# Patient Record
Sex: FEMALE | Race: WHITE | NOT HISPANIC OR LATINO | URBAN - METROPOLITAN AREA
[De-identification: names, ages, dates, MRNs, and addresses within clinical notes are randomized per-mention and may not be internally consistent; named-entity substitution may affect disease eponyms.]

---

## 2020-07-25 ENCOUNTER — TELEPHONE ENCOUNTER (OUTPATIENT)
Dept: URBAN - METROPOLITAN AREA CLINIC 13 | Facility: CLINIC | Age: 75
End: 2020-07-25

## 2020-07-25 RX ORDER — MULTIVIT-MIN/FOLIC/VIT K/LYCOP 400-300MCG
TAKE 1 TABLET DAILY TABLET ORAL
Refills: 0 | OUTPATIENT
End: 2014-10-09

## 2020-07-25 RX ORDER — MELOXICAM 15 MG/1
TAKE 1 TABLET DAILY TABLET ORAL
Refills: 0 | OUTPATIENT
End: 2018-07-23

## 2020-07-25 RX ORDER — TELMISARTAN/HYDROCHLOROTHIAZID 40-12.5 MG
TAKE 1 TABLET DAILY TABLET ORAL
Refills: 0 | OUTPATIENT
End: 2018-07-24

## 2020-07-25 RX ORDER — GINSENG 100 MG
TAKE 1 CAPSULE DAILY PT UNSURE OF DOSAGE CAPSULE ORAL
Refills: 0 | OUTPATIENT
End: 2015-10-01

## 2020-07-25 RX ORDER — UBIDECARENONE/VIT E ACET 100MG-5
TAKE 1 CAPSULE DAILY PT UNSURE OF DOSAGE CAPSULE ORAL
Refills: 0 | OUTPATIENT
Start: 2009-06-16 | End: 2014-10-09

## 2020-07-25 RX ORDER — ST. JOHN'S WORT 300 MG
TAKE 1 CAPSULE DAILY PT UNSURE OF DOSAGE CAPSULE ORAL
Refills: 0 | OUTPATIENT
Start: 2009-06-16 | End: 2014-10-09

## 2020-07-25 RX ORDER — FERROUS FUMARATE 324(106)MG
TAKE 1 TABLET DAILY AS DIRECTED TABLET ORAL
Refills: 0 | OUTPATIENT
End: 2018-07-23

## 2020-07-25 RX ORDER — SUCRALFATE 1 G/1
TAKE 1 TABLET 4 TIMES DAILY, BEFORE MEALS AND AT BEDTIME TABLET ORAL
Qty: 120 | Refills: 2 | OUTPATIENT
Start: 2014-11-04 | End: 2018-07-24

## 2020-07-25 RX ORDER — GLUCOSAMINE SULFATE 500 MG
TAKE 2 CAPSULE DAILY DOSAGE UNKNOWN CAPSULE ORAL
Refills: 0 | OUTPATIENT
Start: 2009-06-16 | End: 2014-10-09

## 2020-07-26 ENCOUNTER — TELEPHONE ENCOUNTER (OUTPATIENT)
Dept: URBAN - METROPOLITAN AREA CLINIC 13 | Facility: CLINIC | Age: 75
End: 2020-07-26

## 2020-07-26 RX ORDER — LOSARTAN POTASSIUM 100 MG/1
TAKE 1 TABLET DAILY TABLET, FILM COATED ORAL
Refills: 0 | Status: ACTIVE | COMMUNITY
Start: 2018-07-23

## 2020-07-26 RX ORDER — OLOPATADINE HYDROCHLORIDE 2 MG/ML
SOLUTION OPHTHALMIC
Qty: 25 | Refills: 0 | Status: ACTIVE | COMMUNITY
Start: 2014-10-10

## 2020-07-26 RX ORDER — PRAVASTATIN SODIUM 80 MG/1
TAKE 1 TABLET DAILY TABLET ORAL
Refills: 0 | Status: ACTIVE | COMMUNITY

## 2020-07-26 RX ORDER — PRAVASTATIN SODIUM 20 MG/1
TABLET ORAL
Qty: 30 | Refills: 0 | Status: ACTIVE | COMMUNITY
Start: 2014-01-09

## 2020-07-26 RX ORDER — NYSTATIN AND TRIAMCINOLONE ACETONIDE 100000; 1 [USP'U]/G; MG/G
OINTMENT TOPICAL
Qty: 30 | Refills: 0 | Status: ACTIVE | COMMUNITY
Start: 2014-08-19

## 2020-07-26 RX ORDER — AZITHROMYCIN DIHYDRATE 250 MG/1
TABLET, FILM COATED ORAL
Qty: 6 | Refills: 0 | Status: ACTIVE | COMMUNITY
Start: 2012-02-27

## 2020-07-26 RX ORDER — TRAMADOL HYDROCHLORIDE 50 MG/1
TAKE 1 TABLET 4 TIMES DAILY PRN TABLET ORAL
Refills: 0 | Status: ACTIVE | COMMUNITY

## 2020-07-26 RX ORDER — MELOXICAM 7.5 MG/1
TABLET ORAL
Qty: 30 | Refills: 0 | Status: ACTIVE | COMMUNITY
Start: 2013-09-30

## 2020-07-26 RX ORDER — CELECOXIB 200 MG/1
CAPSULE ORAL
Qty: 30 | Refills: 0 | Status: ACTIVE | COMMUNITY
Start: 2011-07-25

## 2020-07-26 RX ORDER — TOBRAMYCIN AND DEXAMETHASONE 3; 1 MG/G; MG/G
OINTMENT OPHTHALMIC
Qty: 35 | Refills: 0 | Status: ACTIVE | COMMUNITY
Start: 2014-02-17

## 2020-07-26 RX ORDER — LOTEPREDNOL ETABONATE 5 MG/G
OINTMENT OPHTHALMIC
Qty: 35 | Refills: 0 | Status: ACTIVE | COMMUNITY
Start: 2014-10-10

## 2020-07-26 RX ORDER — MELOXICAM 15 MG/1
TABLET ORAL
Qty: 30 | Refills: 0 | Status: ACTIVE | COMMUNITY
Start: 2014-01-09

## 2020-07-26 RX ORDER — PROMETHAZINE HYDROCHLORIDE 25 MG/1
SUPPOSITORY RECTAL
Qty: 4 | Refills: 0 | Status: ACTIVE | COMMUNITY
Start: 2012-02-27

## 2020-07-26 RX ORDER — AZITHROMYCIN DIHYDRATE 250 MG/1
TABLET, FILM COATED ORAL
Qty: 6 | Refills: 0 | Status: ACTIVE | COMMUNITY
Start: 2014-08-26

## 2020-07-26 RX ORDER — PRAVASTATIN SODIUM 40 MG/1
TABLET ORAL
Qty: 30 | Refills: 0 | Status: ACTIVE | COMMUNITY
Start: 2012-06-12

## 2021-09-23 ENCOUNTER — LAB OUTSIDE AN ENCOUNTER (OUTPATIENT)
Dept: URBAN - METROPOLITAN AREA CLINIC 113 | Facility: CLINIC | Age: 76
End: 2021-09-23

## 2021-09-23 ENCOUNTER — WEB ENCOUNTER (OUTPATIENT)
Dept: URBAN - METROPOLITAN AREA CLINIC 113 | Facility: CLINIC | Age: 76
End: 2021-09-23

## 2021-09-23 ENCOUNTER — OFFICE VISIT (OUTPATIENT)
Dept: URBAN - METROPOLITAN AREA CLINIC 113 | Facility: CLINIC | Age: 76
End: 2021-09-23
Payer: MEDICARE

## 2021-09-23 VITALS
HEIGHT: 59 IN | WEIGHT: 161 LBS | DIASTOLIC BLOOD PRESSURE: 65 MMHG | HEART RATE: 70 BPM | RESPIRATION RATE: 20 BRPM | SYSTOLIC BLOOD PRESSURE: 134 MMHG | TEMPERATURE: 97.7 F | BODY MASS INDEX: 32.46 KG/M2

## 2021-09-23 DIAGNOSIS — K21.9 CHRONIC GERD: ICD-10-CM

## 2021-09-23 DIAGNOSIS — R10.13 EPIGASTRIC PAIN: ICD-10-CM

## 2021-09-23 PROCEDURE — 99213 OFFICE O/P EST LOW 20 MIN: CPT | Performed by: INTERNAL MEDICINE

## 2021-09-23 RX ORDER — TRAMADOL HYDROCHLORIDE 50 MG/1
1 TABLET AS NEEDED TABLET, FILM COATED ORAL ONCE A DAY
Status: ACTIVE | COMMUNITY

## 2021-09-23 RX ORDER — PROMETHAZINE HYDROCHLORIDE 25 MG/1
SUPPOSITORY RECTAL
Qty: 4 | Refills: 0 | Status: ON HOLD | COMMUNITY
Start: 2012-02-27

## 2021-09-23 RX ORDER — PRAVASTATIN SODIUM 20 MG/1
TABLET ORAL
Qty: 30 | Refills: 0 | Status: ON HOLD | COMMUNITY
Start: 2014-01-09

## 2021-09-23 RX ORDER — ROSUVASTATIN CALCIUM 10 MG/1
1 TABLET TABLET, FILM COATED ORAL ONCE A DAY
Status: ACTIVE | COMMUNITY

## 2021-09-23 RX ORDER — CELECOXIB 200 MG/1
CAPSULE ORAL
Qty: 30 | Refills: 0 | Status: ON HOLD | COMMUNITY
Start: 2011-07-25

## 2021-09-23 RX ORDER — ESOMEPRAZOLE MAGNESIUM 40 MG/1
1 CAPSULE CAPSULE, DELAYED RELEASE ORAL ONCE A DAY
Status: ACTIVE | COMMUNITY

## 2021-09-23 RX ORDER — FERROUS SULFATE 325(65) MG
1 TABLET TABLET ORAL ONCE A DAY
Status: ACTIVE | COMMUNITY

## 2021-09-23 RX ORDER — TOBRAMYCIN AND DEXAMETHASONE 3; 1 MG/G; MG/G
OINTMENT OPHTHALMIC
Qty: 35 | Refills: 0 | Status: ON HOLD | COMMUNITY
Start: 2014-02-17

## 2021-09-23 RX ORDER — ST. JOHN'S WORT 300 MG
AS DIRECTED CAPSULE ORAL
Status: ACTIVE | COMMUNITY

## 2021-09-23 RX ORDER — LISINOPRIL 2.5 MG/1
1 TABLET TABLET ORAL ONCE A DAY
Status: ACTIVE | COMMUNITY

## 2021-09-23 RX ORDER — ACETAMINOPHEN ER 650 MG TABLET,EXTENDED RELEASE 650 MG
2 TABLETS AS NEEDED TABLET, EXTENDED RELEASE ORAL
Status: ACTIVE | COMMUNITY

## 2021-09-23 RX ORDER — MELOXICAM 7.5 MG/1
TABLET ORAL
Qty: 30 | Refills: 0 | Status: ON HOLD | COMMUNITY
Start: 2013-09-30

## 2021-09-23 RX ORDER — NYSTATIN AND TRIAMCINOLONE ACETONIDE 100000; 1 [USP'U]/G; MG/G
OINTMENT TOPICAL
Qty: 30 | Refills: 0 | Status: ON HOLD | COMMUNITY
Start: 2014-08-19

## 2021-09-23 RX ORDER — TRAMADOL HYDROCHLORIDE 50 MG/1
TAKE 1 TABLET 4 TIMES DAILY PRN TABLET ORAL
Refills: 0 | Status: ON HOLD | COMMUNITY

## 2021-09-23 RX ORDER — LOSARTAN POTASSIUM 100 MG/1
TAKE 1 TABLET DAILY TABLET, FILM COATED ORAL
Refills: 0 | Status: ON HOLD | COMMUNITY
Start: 2018-07-23

## 2021-09-23 RX ORDER — PRAVASTATIN SODIUM 40 MG/1
TABLET ORAL
Qty: 30 | Refills: 0 | Status: ON HOLD | COMMUNITY
Start: 2012-06-12

## 2021-09-23 RX ORDER — LOTEPREDNOL ETABONATE 5 MG/G
OINTMENT OPHTHALMIC
Qty: 35 | Refills: 0 | Status: ON HOLD | COMMUNITY
Start: 2014-10-10

## 2021-09-23 RX ORDER — MELOXICAM 15 MG/1
TABLET ORAL
Qty: 30 | Refills: 0 | Status: ON HOLD | COMMUNITY
Start: 2014-01-09

## 2021-09-23 RX ORDER — B-COMPLEX WITH VITAMIN C
AS DIRECTED TABLET ORAL
Status: ACTIVE | COMMUNITY

## 2021-09-23 RX ORDER — AZITHROMYCIN DIHYDRATE 250 MG/1
TABLET, FILM COATED ORAL
Qty: 6 | Refills: 0 | Status: ON HOLD | COMMUNITY
Start: 2012-02-27

## 2021-09-23 RX ORDER — METFORMIN ER 500 MG 500 MG/1
1 TABLET WITH EVENING MEAL TABLET ORAL ONCE A DAY
Status: ACTIVE | COMMUNITY

## 2021-09-23 RX ORDER — PRAVASTATIN SODIUM 80 MG/1
TAKE 1 TABLET DAILY TABLET ORAL
Refills: 0 | Status: ON HOLD | COMMUNITY

## 2021-09-23 RX ORDER — ACETAMINOPHEN 500 MG
AS DIRECTED TABLET ORAL
Status: ACTIVE | COMMUNITY

## 2021-09-23 RX ORDER — OLOPATADINE HYDROCHLORIDE 2 MG/ML
SOLUTION OPHTHALMIC
Qty: 25 | Refills: 0 | Status: ON HOLD | COMMUNITY
Start: 2014-10-10

## 2021-09-23 RX ORDER — MULTIVIT-MIN/IRON/FOLIC ACID/K 18-600-40
AS DIRECTED CAPSULE ORAL
Status: ACTIVE | COMMUNITY

## 2021-09-24 ENCOUNTER — CLAIMS CREATED FROM THE CLAIM WINDOW (OUTPATIENT)
Dept: URBAN - METROPOLITAN AREA CLINIC 4 | Facility: CLINIC | Age: 76
End: 2021-09-24
Payer: MEDICARE

## 2021-09-24 ENCOUNTER — OFFICE VISIT (OUTPATIENT)
Dept: URBAN - METROPOLITAN AREA SURGERY CENTER 25 | Facility: SURGERY CENTER | Age: 76
End: 2021-09-24
Payer: MEDICARE

## 2021-09-24 DIAGNOSIS — R10.13 ABDOMINAL PAIN, EPIGASTRIC: ICD-10-CM

## 2021-09-24 DIAGNOSIS — K31.89 REACTIVE GASTROPATHY: ICD-10-CM

## 2021-09-24 DIAGNOSIS — K31.89 NONSURGICAL DUMPING SYNDROME: ICD-10-CM

## 2021-09-24 DIAGNOSIS — R10.12 ABDOMINAL PAIN, LEFT UPPER QUADRANT: ICD-10-CM

## 2021-09-24 PROCEDURE — 88312 SPECIAL STAINS GROUP 1: CPT | Performed by: PATHOLOGY

## 2021-09-24 PROCEDURE — G8907 PT DOC NO EVENTS ON DISCHARG: HCPCS | Performed by: INTERNAL MEDICINE

## 2021-09-24 PROCEDURE — 88305 TISSUE EXAM BY PATHOLOGIST: CPT | Performed by: PATHOLOGY

## 2021-09-24 PROCEDURE — 43239 EGD BIOPSY SINGLE/MULTIPLE: CPT | Performed by: INTERNAL MEDICINE

## 2021-09-24 RX ORDER — METFORMIN ER 500 MG 500 MG/1
1 TABLET WITH EVENING MEAL TABLET ORAL ONCE A DAY
Status: ACTIVE | COMMUNITY

## 2021-09-24 RX ORDER — PRAVASTATIN SODIUM 20 MG/1
TABLET ORAL
Qty: 30 | Refills: 0 | Status: ON HOLD | COMMUNITY
Start: 2014-01-09

## 2021-09-24 RX ORDER — PROMETHAZINE HYDROCHLORIDE 25 MG/1
SUPPOSITORY RECTAL
Qty: 4 | Refills: 0 | Status: ON HOLD | COMMUNITY
Start: 2012-02-27

## 2021-09-24 RX ORDER — PRAVASTATIN SODIUM 80 MG/1
TAKE 1 TABLET DAILY TABLET ORAL
Refills: 0 | Status: ON HOLD | COMMUNITY

## 2021-09-24 RX ORDER — ROSUVASTATIN CALCIUM 10 MG/1
1 TABLET TABLET, FILM COATED ORAL ONCE A DAY
Status: ACTIVE | COMMUNITY

## 2021-09-24 RX ORDER — CELECOXIB 200 MG/1
CAPSULE ORAL
Qty: 30 | Refills: 0 | Status: ON HOLD | COMMUNITY
Start: 2011-07-25

## 2021-09-24 RX ORDER — SUCRALFATE 1 G/1
1 TABLET ON AN EMPTY STOMACH TABLET ORAL
Qty: 120 | Refills: 1 | OUTPATIENT
Start: 2021-09-24 | End: 2021-11-22

## 2021-09-24 RX ORDER — ACETAMINOPHEN 500 MG
AS DIRECTED TABLET ORAL
Status: ACTIVE | COMMUNITY

## 2021-09-24 RX ORDER — TOBRAMYCIN AND DEXAMETHASONE 3; 1 MG/G; MG/G
OINTMENT OPHTHALMIC
Qty: 35 | Refills: 0 | Status: ON HOLD | COMMUNITY
Start: 2014-02-17

## 2021-09-24 RX ORDER — ESOMEPRAZOLE MAGNESIUM 40 MG/1
1 CAPSULE CAPSULE, DELAYED RELEASE ORAL ONCE A DAY
Status: ACTIVE | COMMUNITY

## 2021-09-24 RX ORDER — B-COMPLEX WITH VITAMIN C
AS DIRECTED TABLET ORAL
Status: ACTIVE | COMMUNITY

## 2021-09-24 RX ORDER — LISINOPRIL 2.5 MG/1
1 TABLET TABLET ORAL ONCE A DAY
Status: ACTIVE | COMMUNITY

## 2021-09-24 RX ORDER — OLOPATADINE HYDROCHLORIDE 2 MG/ML
SOLUTION OPHTHALMIC
Qty: 25 | Refills: 0 | Status: ON HOLD | COMMUNITY
Start: 2014-10-10

## 2021-09-24 RX ORDER — AZITHROMYCIN DIHYDRATE 250 MG/1
TABLET, FILM COATED ORAL
Qty: 6 | Refills: 0 | Status: ON HOLD | COMMUNITY
Start: 2012-02-27

## 2021-09-24 RX ORDER — PRAVASTATIN SODIUM 40 MG/1
TABLET ORAL
Qty: 30 | Refills: 0 | Status: ON HOLD | COMMUNITY
Start: 2012-06-12

## 2021-09-24 RX ORDER — ACETAMINOPHEN ER 650 MG TABLET,EXTENDED RELEASE 650 MG
2 TABLETS AS NEEDED TABLET, EXTENDED RELEASE ORAL
Status: ACTIVE | COMMUNITY

## 2021-09-24 RX ORDER — MULTIVIT-MIN/IRON/FOLIC ACID/K 18-600-40
AS DIRECTED CAPSULE ORAL
Status: ACTIVE | COMMUNITY

## 2021-09-24 RX ORDER — ST. JOHN'S WORT 300 MG
AS DIRECTED CAPSULE ORAL
Status: ACTIVE | COMMUNITY

## 2021-09-24 RX ORDER — LOTEPREDNOL ETABONATE 5 MG/G
OINTMENT OPHTHALMIC
Qty: 35 | Refills: 0 | Status: ON HOLD | COMMUNITY
Start: 2014-10-10

## 2021-09-24 RX ORDER — NYSTATIN AND TRIAMCINOLONE ACETONIDE 100000; 1 [USP'U]/G; MG/G
OINTMENT TOPICAL
Qty: 30 | Refills: 0 | Status: ON HOLD | COMMUNITY
Start: 2014-08-19

## 2021-09-24 RX ORDER — LOSARTAN POTASSIUM 100 MG/1
TAKE 1 TABLET DAILY TABLET, FILM COATED ORAL
Refills: 0 | Status: ON HOLD | COMMUNITY
Start: 2018-07-23

## 2021-09-24 RX ORDER — TRAMADOL HYDROCHLORIDE 50 MG/1
TAKE 1 TABLET 4 TIMES DAILY PRN TABLET ORAL
Refills: 0 | Status: ON HOLD | COMMUNITY

## 2021-09-24 RX ORDER — MELOXICAM 15 MG/1
TABLET ORAL
Qty: 30 | Refills: 0 | Status: ON HOLD | COMMUNITY
Start: 2014-01-09

## 2021-09-24 RX ORDER — MELOXICAM 7.5 MG/1
TABLET ORAL
Qty: 30 | Refills: 0 | Status: ON HOLD | COMMUNITY
Start: 2013-09-30

## 2021-09-24 RX ORDER — TRAMADOL HYDROCHLORIDE 50 MG/1
1 TABLET AS NEEDED TABLET, FILM COATED ORAL ONCE A DAY
Status: ACTIVE | COMMUNITY

## 2021-09-24 RX ORDER — FERROUS SULFATE 325(65) MG
1 TABLET TABLET ORAL ONCE A DAY
Status: ACTIVE | COMMUNITY

## 2021-09-28 ENCOUNTER — OFFICE VISIT (OUTPATIENT)
Dept: URBAN - METROPOLITAN AREA SURGERY CENTER 25 | Facility: SURGERY CENTER | Age: 76
End: 2021-09-28

## 2021-11-24 ENCOUNTER — OFFICE VISIT (OUTPATIENT)
Dept: URBAN - METROPOLITAN AREA CLINIC 113 | Facility: CLINIC | Age: 76
End: 2021-11-24

## 2021-11-29 ENCOUNTER — OFFICE VISIT (OUTPATIENT)
Dept: URBAN - METROPOLITAN AREA CLINIC 113 | Facility: CLINIC | Age: 76
End: 2021-11-29
Payer: MEDICARE

## 2021-11-29 ENCOUNTER — DASHBOARD ENCOUNTERS (OUTPATIENT)
Age: 76
End: 2021-11-29

## 2021-11-29 VITALS
DIASTOLIC BLOOD PRESSURE: 62 MMHG | BODY MASS INDEX: 33.47 KG/M2 | WEIGHT: 166 LBS | TEMPERATURE: 97.5 F | HEART RATE: 62 BPM | SYSTOLIC BLOOD PRESSURE: 161 MMHG | HEIGHT: 59 IN | RESPIRATION RATE: 20 BRPM

## 2021-11-29 DIAGNOSIS — K21.9 CHRONIC GERD: ICD-10-CM

## 2021-11-29 PROBLEM — 235595009 GASTROESOPHAGEAL REFLUX DISEASE: Status: ACTIVE | Noted: 2021-09-23

## 2021-11-29 PROBLEM — 428283002: Status: ACTIVE | Noted: 2021-11-29

## 2021-11-29 PROBLEM — 79922009: Status: ACTIVE | Noted: 2021-11-29

## 2021-11-29 PROCEDURE — 99213 OFFICE O/P EST LOW 20 MIN: CPT | Performed by: NURSE PRACTITIONER

## 2021-11-29 RX ORDER — LISINOPRIL 2.5 MG/1
1 TABLET TABLET ORAL ONCE A DAY
Status: ACTIVE | COMMUNITY

## 2021-11-29 RX ORDER — METFORMIN ER 500 MG 500 MG/1
1 TABLET WITH EVENING MEAL TABLET ORAL ONCE A DAY
Status: ACTIVE | COMMUNITY

## 2021-11-29 RX ORDER — TRAMADOL HYDROCHLORIDE 50 MG/1
TAKE 1 TABLET 4 TIMES DAILY PRN TABLET ORAL
Refills: 0 | Status: ON HOLD | COMMUNITY

## 2021-11-29 RX ORDER — CELECOXIB 200 MG/1
CAPSULE ORAL
Qty: 30 | Refills: 0 | Status: ON HOLD | COMMUNITY
Start: 2011-07-25

## 2021-11-29 RX ORDER — ROSUVASTATIN CALCIUM 10 MG/1
1 TABLET TABLET, FILM COATED ORAL ONCE A DAY
Status: ACTIVE | COMMUNITY

## 2021-11-29 RX ORDER — MELOXICAM 15 MG/1
TABLET ORAL
Qty: 30 | Refills: 0 | Status: ON HOLD | COMMUNITY
Start: 2014-01-09

## 2021-11-29 RX ORDER — TOBRAMYCIN AND DEXAMETHASONE 3; 1 MG/G; MG/G
OINTMENT OPHTHALMIC
Qty: 35 | Refills: 0 | Status: ON HOLD | COMMUNITY
Start: 2014-02-17

## 2021-11-29 RX ORDER — FERROUS SULFATE 325(65) MG
1 TABLET TABLET ORAL ONCE A DAY
Status: ACTIVE | COMMUNITY

## 2021-11-29 RX ORDER — AZITHROMYCIN DIHYDRATE 250 MG/1
TABLET, FILM COATED ORAL
Qty: 6 | Refills: 0 | Status: ON HOLD | COMMUNITY
Start: 2012-02-27

## 2021-11-29 RX ORDER — ESOMEPRAZOLE MAGNESIUM 40 MG/1
1 CAPSULE CAPSULE, DELAYED RELEASE ORAL ONCE A DAY
Status: ACTIVE | COMMUNITY

## 2021-11-29 RX ORDER — TRAMADOL HYDROCHLORIDE 50 MG/1
1 TABLET AS NEEDED TABLET, FILM COATED ORAL ONCE A DAY
Status: ACTIVE | COMMUNITY

## 2021-11-29 RX ORDER — PRAVASTATIN SODIUM 40 MG/1
TABLET ORAL
Qty: 30 | Refills: 0 | Status: ON HOLD | COMMUNITY
Start: 2012-06-12

## 2021-11-29 RX ORDER — B-COMPLEX WITH VITAMIN C
AS DIRECTED TABLET ORAL
Status: ACTIVE | COMMUNITY

## 2021-11-29 RX ORDER — ACETAMINOPHEN ER 650 MG TABLET,EXTENDED RELEASE 650 MG
2 TABLETS AS NEEDED TABLET, EXTENDED RELEASE ORAL
Status: ACTIVE | COMMUNITY

## 2021-11-29 RX ORDER — PRAVASTATIN SODIUM 80 MG/1
TAKE 1 TABLET DAILY TABLET ORAL
Refills: 0 | Status: ON HOLD | COMMUNITY

## 2021-11-29 RX ORDER — MELOXICAM 7.5 MG/1
TABLET ORAL
Qty: 30 | Refills: 0 | Status: ON HOLD | COMMUNITY
Start: 2013-09-30

## 2021-11-29 RX ORDER — PRAVASTATIN SODIUM 20 MG/1
TABLET ORAL
Qty: 30 | Refills: 0 | Status: ON HOLD | COMMUNITY
Start: 2014-01-09

## 2021-11-29 RX ORDER — ST. JOHN'S WORT 300 MG
AS DIRECTED CAPSULE ORAL
Status: ACTIVE | COMMUNITY

## 2021-11-29 RX ORDER — LOSARTAN POTASSIUM 100 MG/1
TAKE 1 TABLET DAILY TABLET, FILM COATED ORAL
Refills: 0 | Status: ON HOLD | COMMUNITY
Start: 2018-07-23

## 2021-11-29 RX ORDER — OLOPATADINE HYDROCHLORIDE 2 MG/ML
SOLUTION OPHTHALMIC
Qty: 25 | Refills: 0 | Status: ON HOLD | COMMUNITY
Start: 2014-10-10

## 2021-11-29 RX ORDER — NYSTATIN AND TRIAMCINOLONE ACETONIDE 100000; 1 [USP'U]/G; MG/G
OINTMENT TOPICAL
Qty: 30 | Refills: 0 | Status: ON HOLD | COMMUNITY
Start: 2014-08-19

## 2021-11-29 RX ORDER — ACETAMINOPHEN 500 MG
AS DIRECTED TABLET ORAL
Status: ACTIVE | COMMUNITY

## 2021-11-29 RX ORDER — SUCRALFATE 1 G/1
1 TABLET ON AN EMPTY STOMACH TABLET ORAL
Status: ACTIVE | COMMUNITY

## 2021-11-29 RX ORDER — LOTEPREDNOL ETABONATE 5 MG/G
OINTMENT OPHTHALMIC
Qty: 35 | Refills: 0 | Status: ON HOLD | COMMUNITY
Start: 2014-10-10

## 2021-11-29 RX ORDER — PROMETHAZINE HYDROCHLORIDE 25 MG/1
SUPPOSITORY RECTAL
Qty: 4 | Refills: 0 | Status: ON HOLD | COMMUNITY
Start: 2012-02-27

## 2021-11-29 RX ORDER — MULTIVIT-MIN/IRON/FOLIC ACID/K 18-600-40
AS DIRECTED CAPSULE ORAL
Status: ACTIVE | COMMUNITY

## 2021-11-29 NOTE — HPI-TODAY'S VISIT:
This is a 76-year-old female with a history of diabetes, hypertension, hyperlipidemia, fatty liver, colon diverticulosis, epigastric pain, and GERD presenting for follow-up after an EGD. She was last seen 9/23/2021 referred from Dr. Gilbert for evaluation of epigastric pain. EGD 9/24/2021:Normal esophagus, chronic gastritis status post biopsy, normal examined duodenum.  Pathology: Antral biopsies demonstrated chemical/reactive gastropathy without H. pylori or intestinal metaplasia.  Stomach body biopsy demonstrated PPI effect. She reports symptom improvement on sucralfate 4 times a day (prescribed at the time of her EGD).  She continues to have mild discomfort indicating epigastrium that she describes as "a little warm."  She continues to take Nexium daily.  She has occasional brief episodes of diet dependent diarrhea and otherwise denies abdominal symptoms.  She does not take NSAIDs.  She also avoids acidic food and spicy food.  She has been experiencing some stress and is of the opinion this may be contributing to her symptoms.

## 2021-12-16 ENCOUNTER — ERX REFILL RESPONSE (OUTPATIENT)
Dept: URBAN - METROPOLITAN AREA CLINIC 113 | Facility: CLINIC | Age: 76
End: 2021-12-16

## 2021-12-16 RX ORDER — SUCRALFATE 1 G/1
TAKE 1 TABLET BY MOUTH 4 TIMES A DAY ON AN EMPTY STOMACH TABLET ORAL
Qty: 120 TABLET | Refills: 1 | OUTPATIENT

## 2022-01-16 ENCOUNTER — ERX REFILL RESPONSE (OUTPATIENT)
Dept: URBAN - METROPOLITAN AREA CLINIC 113 | Facility: CLINIC | Age: 77
End: 2022-01-16

## 2022-01-16 RX ORDER — SUCRALFATE 1 G/1
TAKE 1 TABLET BY MOUTH 4 TIMES A DAY ON AN EMPTY STOMACH TABLET ORAL
Qty: 120 TABLET | Refills: 1 | OUTPATIENT

## 2022-02-16 ENCOUNTER — ERX REFILL RESPONSE (OUTPATIENT)
Dept: URBAN - METROPOLITAN AREA CLINIC 113 | Facility: CLINIC | Age: 77
End: 2022-02-16

## 2022-02-16 RX ORDER — SUCRALFATE 1 G/1
TAKE 1 TABLET BY MOUTH 4 TIMES A DAY ON AN EMPTY STOMACH TABLET ORAL
Qty: 120 TABLET | Refills: 1 | OUTPATIENT

## 2023-02-15 NOTE — HPI-TODAY'S VISIT:
Harjit Salazar is a 76-year-old female with NIDDM, hypertension and hyperlipidemia referred by Dr. Gilbert For evaluation of upper abdominal discomfort  She has not Been seen in the office since 2018.  The patient recently has been feeling what she terms a "hot stomach" in the area of her epigastric and left upper quadrant.  She has postprandial abdominal pain, which she describes as a pressure-like sensation, often worse after eating.  This happens multiple times per day.  She denies any melena, bright red rectal bleeding, fever, chills, or sweats, nausea vomiting, weight loss.  At her following ordered a CT scan of the abdomen and pelvis on her which was reportedly negative.  She also continued her Nexium but added Pepcid, which does not seem to help.  She has no changes in bowel habits, denies any lower GI tract symptoms at all.  She has lost no weight.  She admits that stress may be planning a role, as she has been unable to travel to [facility] during the pandemic to see her grandchildren.  The patient has had similar complaints before.    Prior EGD (7/09) : mild antral gastritis. Prior Colonoscopy (7/09) : pancolonic diverticulosis and a 10 mm hyperplastic sigmoid colon polyp. Contrasted CT chest/abd/pelvis (5-8-12) : unchanged small bilateral pulmonary nodules and mild diffuse fatty liver.   At a visit on Maria C 15, 2012, she was noted to have iron deficiency anemia, as defined by her routine blood work. Hemoglobin was 11.5 with an MCV of 90 and ferritin of 8. Iron studies (4-12-12) : Iron 91, TIBC 452 and iron sat 20%.  At that time, she had noted no bright red blood per rectum or melena.  She took daily oral iron supplementation for one month. Hemoglobin subsequently rebounded to 13 (MCV 99, ferritin 17) as of 6-7-12. She was hemoccult negative at Dr. Gilbert's office at that time. The patient was having complaints of left upper quadrant pain at that time which had been present for 2 years.      LFTs (3-29-12) : AST 21, ALT 16, AP 50 and TB 0.4.   The patient underwent repeat colonoscopy on June 18, 2012.  This showed left colon diverticulosis but was otherwise negative.     She had repeat laboratory work performed in the early part of October, 2014.  This included a CBC which showed a white blood cell count of 6300, hemoglobin of 12.1, hematocrit of 34.4%, and a platelet count of 221,000.  Her AST was 16, ALT 13, total bilirubin 0.5, and alkaline phosphatase 36.  Amylase and lipase phase were normal at 68 and 34, respectively.    Upper GI endoscopy was repeated on October 9, 2014 due to persistent complaints of left upper quadrant abdominal discomfort..  Small bowel biopsies were performed at that time and were negative.  Gastric biopsies showed only gastritis; her CLOtest was negative..  The patient had a repeat CT scan of the abdomen and pelvis performed on October 10, 2014.  This revealed circumferential gastric wall thickening and a fatty liver.  I recommended that she consider endoscopic ultrasonography by Dr. Leahy to further delineate the gastric wall thickening.  However, she elected to defer this as she was feeling better.  She was placed on Carafate which seemed to improve her left upper quadrant discomfort.  She also had decreased her oral intake and limited her intake of meat.    She was seen here once again in 2015 for evaluation of weight loss and ongoing anemia. Because of the above finding of gastric wall thickening on CT, the patient underwent endoscopic ultrasound examination by Dr. Leahy on December 8, 2015.  This showed gastritis and some mild thickening of the gastric wall, but it did appear benign, and there was no other consideration of further diagnostic testing.   She was last seen on July 24, 2018, when she underwent upper and lower endoscopy for anemia.  Upper endoscopy was normal.  Colonoscopy on that same date showed only diverticulosis. No